# Patient Record
Sex: FEMALE
[De-identification: names, ages, dates, MRNs, and addresses within clinical notes are randomized per-mention and may not be internally consistent; named-entity substitution may affect disease eponyms.]

---

## 2023-05-08 PROBLEM — Z00.00 ENCOUNTER FOR PREVENTIVE HEALTH EXAMINATION: Status: ACTIVE | Noted: 2023-05-08

## 2023-05-16 ENCOUNTER — APPOINTMENT (OUTPATIENT)
Dept: SURGICAL ONCOLOGY | Facility: CLINIC | Age: 38
End: 2023-05-16
Payer: MEDICAID

## 2023-05-16 VITALS
HEIGHT: 66 IN | DIASTOLIC BLOOD PRESSURE: 66 MMHG | HEART RATE: 79 BPM | SYSTOLIC BLOOD PRESSURE: 100 MMHG | WEIGHT: 115 LBS | BODY MASS INDEX: 18.48 KG/M2

## 2023-05-16 DIAGNOSIS — Z78.9 OTHER SPECIFIED HEALTH STATUS: ICD-10-CM

## 2023-05-16 DIAGNOSIS — Z80.0 FAMILY HISTORY OF MALIGNANT NEOPLASM OF DIGESTIVE ORGANS: ICD-10-CM

## 2023-05-16 DIAGNOSIS — Z80.42 FAMILY HISTORY OF MALIGNANT NEOPLASM OF PROSTATE: ICD-10-CM

## 2023-05-16 DIAGNOSIS — E06.3 AUTOIMMUNE THYROIDITIS: ICD-10-CM

## 2023-05-16 PROCEDURE — 99203 OFFICE O/P NEW LOW 30 MIN: CPT

## 2023-05-16 NOTE — CONSULT LETTER
[Dear  ___] : Dear  [unfilled], [Consult Letter:] : I had the pleasure of evaluating your patient, [unfilled]. [Please see my note below.] : Please see my note below. [Consult Closing:] : Thank you very much for allowing me to participate in the care of this patient.  If you have any questions, please do not hesitate to contact me. [Sincerely,] : Sincerely, [FreeTextEntry3] : Merna Tellez MD\par Breast Surgeon\par Division of Surgical Oncology\par Department of Surgery\par [address] [place] [address]\[place] [address] \par Tel: [phone]\par Fax: [phone]\par Email: [email or web address]

## 2023-05-16 NOTE — PHYSICAL EXAM
[Normocephalic] : normocephalic [Atraumatic] : atraumatic [EOMI] : extra ocular movement intact [PERRL] : pupils equal, round and reactive to light [Sclera nonicteric] : sclera nonicteric [Supple] : supple [No Supraclavicular Adenopathy] : no supraclavicular adenopathy [Examined in the supine and seated position] : examined in the supine and seated position [Bra Size: ___] : Bra Size: [unfilled] [None] : no ptosis [No dominant masses] : no dominant masses in right breast  [No dominant masses] : no dominant masses left breast [No Nipple Retraction] : no left nipple retraction [No Nipple Discharge] : no left nipple discharge [No Axillary Lymphadenopathy] : no left axillary lymphadenopathy [No Edema] : no edema [No Rashes] : no rashes [No Ulceration] : no ulceration [de-identified] : non-labored respirations  [de-identified] : B/L dense fibronodular tissue [de-identified] : at 11:00, no dominant palpable mass appreciated. Pt unable to localize. No tenderness

## 2023-05-16 NOTE — PAST MEDICAL HISTORY
[Menstruating] : The patient is menstruating [Menarche Age ____] : age at menarche was [unfilled] [History of Hormone Replacement Treatment] : has no history of hormone replacement treatment [Definite ___ (Date)] : the last menstrual period was [unfilled] [Regular Cycle Intervals] : have been regular [Total Preg ___] : G[unfilled] [FreeTextEntry6] : None [FreeTextEntry7] : None [FreeTextEntry8] : N/A

## 2023-05-16 NOTE — HISTORY OF PRESENT ILLNESS
[FreeTextEntry1] : The patient is a 37 year old female referred for consultation by Dr. Tsering Raymond for Left breast palpable mass, B/L cysts.\par \par The patient reports that she noticed a left breast lump approximately 1 month ago.  She noticed that it was also tender to palpation.  She presented to her PCP who sent the patient for imaging.  She reports that at this time, she no longer feels a lump, and it is no longer tender.  She has never had any breast imaging prior.  She has never had any breast complaints prior to now.\par \par 4/16/2023 B/L DM (MSR) revealed extremely dense breast\par – L upper central (palp) negative\par –R negative\par \par 4/16/2023 B/L US\par – R 1:00 periareolar 4 mm debris containing cyst\par – R 3:00 periareolar 8 mm circumscribed, wider than tall hypoechoic lesion\par – R 4:00 prominent ducts seen\par – R 5:00 8 mm cyst\par – R 9:00 N1 6 mm debris containing cyst\par – R 10:00 N1 8 mm cyst\par – R axilla prominent hypoechoic tissue which may reflect excessive parenchyma versus skin thickening\par – L 2:00 N2 5 mm complicated cyst\par – L 4:00 N2 4 mm complicated cyst\par –L 11:00 N3 (palp) 2.8 x 1.9 x 1.1 cm complicated cyst slight wall thickening may represent inflammation.  Recommend 6-month follow-up\par –L axilla prominent hypoechoic tissue, may reflect accessory parenchyma versus skin thickening\par \par PMH: Hashimoto's \par PSH: Denies\par Meds: Levothyroxine\par ALL: Penicillin\par SH: No tobacco.  Rare EtOH\par FH: No breast cancer.  Paternal aunt with stomach cancer at age 50s.  Paternal uncle with prostate cancer age 50s.\par GYN: Menarche 14.  LMP 5/4/2023, regular.  G0, P0.  No OCP.  No fertility.  No HRT.

## 2023-05-16 NOTE — ASSESSMENT
[FreeTextEntry1] : The patient is a 37 year old female referred for consultation by Dr. Tsering Raymond for Left breast palpable mass, B/L cysts.\par \par BIRADS 3 findings are considered have a less than or equal to 2% likelihood of malignancy. This category reduces the number of false-positive biopsies and justifies a period of watchful waiting, avoiding unnecessary workup when the likelihood of malignancy is very low. \par \par Ultrasound BI-RADS 3 masses will typically undergo a 6-, 12-, and 24-month surveillance protocol to ensure stability and continued benign appearance. After 24 months of stability, the patient may return to routine screening.  If during this surveillance period, the mass decreases in size or demonstrates resolution, it can be downgraded to a BI-RADS 2 (benign). If during the surveillance period the mass grows in size or demonstrates suspicious qualities, then the BI-RADS category may be upgraded to a biopsy recommendation.\par  \par Breast cysts are fluid-filled round or ovoid masses. They can be found on either palpation or by imaging. A breast cyst is diagnosed by breast ultrasound, which also classifies it as simple, complicated, or complex. The sonographic appearance helps guide clinical management.\par \par Simple cysts, clustered simple microcysts, and most complicated cysts are benign and no intervention is needed. \par \par Complicated cysts are rarely malignant, and can be followed every six months. Biopsy is indicated if the lesion increases in size or changes in characteristics on repeat imaging. \par \par If complicated cysts completely collapse after FNA, this is considered benign. Complicated cysts that fail to completely collapse after FNA require further imaging, and CNB.\par \par Complex cysts should be biopsied with CNB. \par \par Surgical excision is indicated for complex cysts that are not amenable to CNB and when pathology results from a CNB are discordant, atypical, indeterminate, or reveal a malignancy. \par \par Plan:\par - Given exam today is unremarkable for any palpable or tender masses, will continue observation\par - Pt to follow-up with Left US in 6 months\par - Pt knows to call if any changes or new complaints before then\par - RTO 6 months

## 2023-11-20 PROBLEM — N60.01 BILATERAL BREAST CYSTS: Status: ACTIVE | Noted: 2023-05-12

## 2023-11-20 PROBLEM — N63.20 LEFT BREAST MASS: Status: ACTIVE | Noted: 2023-05-12

## 2023-11-21 ENCOUNTER — APPOINTMENT (OUTPATIENT)
Dept: SURGICAL ONCOLOGY | Facility: CLINIC | Age: 38
End: 2023-11-21
Payer: MEDICAID

## 2023-11-21 VITALS
OXYGEN SATURATION: 100 % | WEIGHT: 115 LBS | HEART RATE: 81 BPM | BODY MASS INDEX: 18.48 KG/M2 | HEIGHT: 66 IN | SYSTOLIC BLOOD PRESSURE: 107 MMHG | DIASTOLIC BLOOD PRESSURE: 69 MMHG

## 2023-11-21 DIAGNOSIS — N60.01 SOLITARY CYST OF RIGHT BREAST: ICD-10-CM

## 2023-11-21 DIAGNOSIS — N60.02 SOLITARY CYST OF RIGHT BREAST: ICD-10-CM

## 2023-11-21 DIAGNOSIS — N63.20 UNSPECIFIED LUMP IN THE LEFT BREAST, UNSPECIFIED QUADRANT: ICD-10-CM

## 2023-11-21 PROCEDURE — 99213 OFFICE O/P EST LOW 20 MIN: CPT

## 2023-11-21 RX ORDER — LEVOTHYROXINE SODIUM 0.07 MG/1
75 TABLET ORAL
Refills: 0 | Status: ACTIVE | COMMUNITY

## 2023-11-21 RX ORDER — LEVOTHYROXINE SODIUM 0.03 MG/1
25 TABLET ORAL
Refills: 0 | Status: DISCONTINUED | COMMUNITY
End: 2023-11-21